# Patient Record
Sex: FEMALE | Race: WHITE | Employment: UNEMPLOYED | URBAN - NONMETROPOLITAN AREA
[De-identification: names, ages, dates, MRNs, and addresses within clinical notes are randomized per-mention and may not be internally consistent; named-entity substitution may affect disease eponyms.]

---

## 2018-06-05 ENCOUNTER — OFFICE VISIT (OUTPATIENT)
Dept: INTERNAL MEDICINE CLINIC | Age: 58
End: 2018-06-05
Payer: COMMERCIAL

## 2018-06-05 VITALS
HEIGHT: 62 IN | BODY MASS INDEX: 38.05 KG/M2 | DIASTOLIC BLOOD PRESSURE: 90 MMHG | SYSTOLIC BLOOD PRESSURE: 140 MMHG | HEART RATE: 92 BPM | WEIGHT: 206.8 LBS

## 2018-06-05 DIAGNOSIS — E23.0 HYPOPITUITARISM (HCC): ICD-10-CM

## 2018-06-05 DIAGNOSIS — R61 HYPERHIDROSIS: ICD-10-CM

## 2018-06-05 DIAGNOSIS — K21.9 GASTROESOPHAGEAL REFLUX DISEASE WITHOUT ESOPHAGITIS: ICD-10-CM

## 2018-06-05 DIAGNOSIS — E11.9 TYPE 2 DIABETES MELLITUS WITHOUT COMPLICATION, UNSPECIFIED LONG TERM INSULIN USE STATUS: ICD-10-CM

## 2018-06-05 DIAGNOSIS — N95.1 SWEATS, MENOPAUSAL: Primary | ICD-10-CM

## 2018-06-05 DIAGNOSIS — E66.9 OBESITY (BMI 30-39.9): ICD-10-CM

## 2018-06-05 DIAGNOSIS — E78.5 HYPERLIPIDEMIA, UNSPECIFIED HYPERLIPIDEMIA TYPE: ICD-10-CM

## 2018-06-05 DIAGNOSIS — I10 HYPERTENSION, UNSPECIFIED TYPE: ICD-10-CM

## 2018-06-05 PROCEDURE — 3046F HEMOGLOBIN A1C LEVEL >9.0%: CPT | Performed by: INTERNAL MEDICINE

## 2018-06-05 PROCEDURE — 2022F DILAT RTA XM EVC RTNOPTHY: CPT | Performed by: INTERNAL MEDICINE

## 2018-06-05 PROCEDURE — G8417 CALC BMI ABV UP PARAM F/U: HCPCS | Performed by: INTERNAL MEDICINE

## 2018-06-05 PROCEDURE — 1036F TOBACCO NON-USER: CPT | Performed by: INTERNAL MEDICINE

## 2018-06-05 PROCEDURE — G8427 DOCREV CUR MEDS BY ELIG CLIN: HCPCS | Performed by: INTERNAL MEDICINE

## 2018-06-05 PROCEDURE — 99204 OFFICE O/P NEW MOD 45 MIN: CPT | Performed by: INTERNAL MEDICINE

## 2018-06-05 PROCEDURE — 3017F COLORECTAL CA SCREEN DOC REV: CPT | Performed by: INTERNAL MEDICINE

## 2018-06-05 RX ORDER — GABAPENTIN 600 MG/1
800 TABLET ORAL 3 TIMES DAILY
COMMUNITY

## 2018-06-05 RX ORDER — TIZANIDINE 4 MG/1
4 TABLET ORAL 3 TIMES DAILY
COMMUNITY

## 2018-06-05 RX ORDER — VENLAFAXINE HYDROCHLORIDE 225 MG/1
225 TABLET, EXTENDED RELEASE ORAL
COMMUNITY

## 2018-06-05 RX ORDER — ALOGLIPTIN AND METFORMIN HYDROCHLORIDE 12.5; 1 MG/1; MG/1
1 TABLET, FILM COATED ORAL 2 TIMES DAILY
COMMUNITY
End: 2021-05-05 | Stop reason: ALTCHOICE

## 2018-06-05 RX ORDER — METOPROLOL TARTRATE 50 MG/1
50 TABLET, FILM COATED ORAL DAILY
COMMUNITY

## 2018-06-05 RX ORDER — ALBUTEROL SULFATE 90 UG/1
2 AEROSOL, METERED RESPIRATORY (INHALATION) EVERY 6 HOURS PRN
COMMUNITY

## 2018-06-05 RX ORDER — ATORVASTATIN CALCIUM 40 MG/1
40 TABLET, FILM COATED ORAL DAILY
COMMUNITY

## 2018-06-05 RX ORDER — RISPERIDONE 4 MG/1
4 TABLET, FILM COATED ORAL NIGHTLY
COMMUNITY

## 2018-06-05 RX ORDER — CLOPIDOGREL BISULFATE 75 MG/1
75 TABLET ORAL DAILY
COMMUNITY
End: 2021-05-05 | Stop reason: ALTCHOICE

## 2018-06-05 RX ORDER — LORATADINE 10 MG/1
10 TABLET ORAL DAILY
COMMUNITY
End: 2021-05-05 | Stop reason: ALTCHOICE

## 2018-06-05 RX ORDER — CLONAZEPAM 0.5 MG/1
0.5 TABLET ORAL DAILY
COMMUNITY

## 2018-06-05 RX ORDER — ACETAMINOPHEN AND CODEINE PHOSPHATE 300; 60 MG/1; MG/1
1 TABLET ORAL 4 TIMES DAILY
COMMUNITY
End: 2021-05-05 | Stop reason: ALTCHOICE

## 2018-06-05 RX ORDER — AMILORIDE HYDROCHLORIDE 5 MG/1
5 TABLET ORAL DAILY
COMMUNITY

## 2018-06-05 RX ORDER — MONTELUKAST SODIUM 10 MG/1
10 TABLET ORAL NIGHTLY
COMMUNITY

## 2018-06-05 RX ORDER — ARIPIPRAZOLE 30 MG/1
30 TABLET ORAL DAILY
COMMUNITY

## 2018-06-05 RX ORDER — ISOSORBIDE MONONITRATE 30 MG/1
30 TABLET, EXTENDED RELEASE ORAL DAILY
COMMUNITY

## 2018-06-05 RX ORDER — FAMOTIDINE 20 MG/1
20 TABLET, FILM COATED ORAL 2 TIMES DAILY
COMMUNITY

## 2018-06-05 ASSESSMENT — PATIENT HEALTH QUESTIONNAIRE - PHQ9
SUM OF ALL RESPONSES TO PHQ QUESTIONS 1-9: 0
SUM OF ALL RESPONSES TO PHQ9 QUESTIONS 1 & 2: 0
1. LITTLE INTEREST OR PLEASURE IN DOING THINGS: 0
2. FEELING DOWN, DEPRESSED OR HOPELESS: 0

## 2018-06-12 LAB
FOLLICLE STIMULATING HORMONE: 65.3 IU/L
LH: 27.5 IU/L
PROGESTERONE LEVEL: <0.05 NG/ML
PROLACTIN: 11.5 NG/ML (ref 4.8–33)
TSH SERPL DL<=0.05 MIU/L-ACNC: 2.01 UIU/ML (ref 0.4–4.1)

## 2018-06-13 LAB
ADRENOCORTICOTROPIC HORMONE: 8 PG/ML (ref 6–58)
GROWTH HORMONE: <0.05 NG/ML (ref 0.05–4)
IGF BINDING PROTEIN-3: 6180 NG/ML (ref 3400–6800)

## 2018-06-14 LAB
ESTRADIOL FREE: 6.8 PG/ML
ESTROGEN TOTAL: 27.2 PG/ML
ESTRONE: 20.4 PG/ML

## 2018-06-20 ENCOUNTER — OFFICE VISIT (OUTPATIENT)
Dept: INTERNAL MEDICINE CLINIC | Age: 58
End: 2018-06-20
Payer: COMMERCIAL

## 2018-06-20 VITALS
WEIGHT: 207 LBS | OXYGEN SATURATION: 97 % | DIASTOLIC BLOOD PRESSURE: 80 MMHG | HEART RATE: 116 BPM | SYSTOLIC BLOOD PRESSURE: 124 MMHG | HEIGHT: 62 IN | BODY MASS INDEX: 38.09 KG/M2

## 2018-06-20 DIAGNOSIS — N95.1 MENOPAUSAL HOT FLUSHES: Primary | ICD-10-CM

## 2018-06-20 DIAGNOSIS — G89.4 CHRONIC PAIN SYNDROME: ICD-10-CM

## 2018-06-20 DIAGNOSIS — E78.5 HYPERLIPIDEMIA, UNSPECIFIED HYPERLIPIDEMIA TYPE: ICD-10-CM

## 2018-06-20 DIAGNOSIS — I10 HYPERTENSION, UNSPECIFIED TYPE: ICD-10-CM

## 2018-06-20 PROCEDURE — G8417 CALC BMI ABV UP PARAM F/U: HCPCS | Performed by: INTERNAL MEDICINE

## 2018-06-20 PROCEDURE — 3017F COLORECTAL CA SCREEN DOC REV: CPT | Performed by: INTERNAL MEDICINE

## 2018-06-20 PROCEDURE — G8427 DOCREV CUR MEDS BY ELIG CLIN: HCPCS | Performed by: INTERNAL MEDICINE

## 2018-06-20 PROCEDURE — 99213 OFFICE O/P EST LOW 20 MIN: CPT | Performed by: INTERNAL MEDICINE

## 2018-06-20 PROCEDURE — 1036F TOBACCO NON-USER: CPT | Performed by: INTERNAL MEDICINE

## 2021-05-05 ENCOUNTER — INITIAL CONSULT (OUTPATIENT)
Dept: NEUROLOGY | Age: 61
End: 2021-05-05
Payer: MEDICARE

## 2021-05-05 VITALS
HEIGHT: 62 IN | BODY MASS INDEX: 41.41 KG/M2 | DIASTOLIC BLOOD PRESSURE: 58 MMHG | WEIGHT: 225 LBS | HEART RATE: 88 BPM | SYSTOLIC BLOOD PRESSURE: 104 MMHG

## 2021-05-05 DIAGNOSIS — G62.9 SMALL FIBER NEUROPATHY: ICD-10-CM

## 2021-05-05 DIAGNOSIS — R20.2 NUMBNESS AND TINGLING OF BOTH FEET: ICD-10-CM

## 2021-05-05 DIAGNOSIS — R20.0 NUMBNESS AND TINGLING OF BOTH FEET: ICD-10-CM

## 2021-05-05 DIAGNOSIS — E11.40 DIABETIC NEUROPATHY, PAINFUL (HCC): Primary | ICD-10-CM

## 2021-05-05 PROCEDURE — 99204 OFFICE O/P NEW MOD 45 MIN: CPT | Performed by: PSYCHIATRY & NEUROLOGY

## 2021-05-05 RX ORDER — CEFUROXIME AXETIL 250 MG/1
TABLET ORAL
COMMUNITY
Start: 2021-04-28

## 2021-05-05 RX ORDER — OXCARBAZEPINE 150 MG/1
150 TABLET, FILM COATED ORAL 2 TIMES DAILY
Qty: 60 TABLET | Refills: 3 | Status: SHIPPED | OUTPATIENT
Start: 2021-05-05

## 2021-05-05 RX ORDER — ASPIRIN 81 MG/1
81 TABLET ORAL DAILY
COMMUNITY

## 2021-05-05 RX ORDER — INSULIN DEGLUDEC INJECTION 100 U/ML
INJECTION, SOLUTION SUBCUTANEOUS
COMMUNITY

## 2021-05-05 RX ORDER — INSULIN ASPART 100 [IU]/ML
INJECTION, SOLUTION INTRAVENOUS; SUBCUTANEOUS
COMMUNITY
Start: 2021-04-28

## 2021-05-05 RX ORDER — HYDROCODONE BITARTRATE AND ACETAMINOPHEN 5; 325 MG/1; MG/1
TABLET ORAL
COMMUNITY
Start: 2020-12-15

## 2021-05-05 RX ORDER — AMLODIPINE BESYLATE 5 MG/1
5 TABLET ORAL
COMMUNITY
Start: 2020-12-15

## 2021-05-05 RX ORDER — BUSPIRONE HYDROCHLORIDE 15 MG/1
TABLET ORAL
COMMUNITY
Start: 2021-04-19

## 2021-05-05 RX ORDER — BUPROPION HYDROCHLORIDE 300 MG/1
TABLET ORAL
COMMUNITY
Start: 2021-04-16

## 2021-05-05 RX ORDER — FLUTICASONE PROPIONATE 50 MCG
SPRAY, SUSPENSION (ML) NASAL
COMMUNITY
Start: 2021-04-15

## 2021-05-05 NOTE — LETTER
[address] [facility]  [address] hospitals  Dept: [phone]  Dept Fax: [phone]  Loc: [phone]    Blessing Zelaya MD        5/5/2021      Patient:  Shi Harp  MRN:  [id number]  YOB: 1960  Date of Visit:  5/5/2021    Dear Dr. Cassie Esquivel,    Thank you for referring Apple Bhatt to me for consultation. Please see attached visit summary with my findings. If you have any questions, please do not hesitate to call me.       Sincerely,         Blessing Zelaya MD

## 2021-05-05 NOTE — PROGRESS NOTES
Chief Complaint   Patient presents with    Consultation     neuropathy     This is a 64year old female with numbness and burning sensation in bilateral feet, right worse than left, for the past year that has progressively gotten worse over the past 6 months. Her family doctor gave her a prescription for Gabapentin 800 mg three times daily with no improvement in symptoms. She was then referred to neurology. Symptoms are severe and constant. Symptoms improve when she elevates her feet. She has occasional numbness and burning in bilateral hands. No trunk numbness. She uses a wheelchair for ambulation due to COPD. When she does walk, no frequent falls. She has off balance feeling when she stands. No flu like illness or rash prior to onset. History is significant for diabetes, diagnosed at least 8 years ago. She reports her last hemoglobin A1c was 7.5. No history of chemotherapy or radiation. She has neck pain and back pain. She was told she has low vitamin B12 and is now taking vitamin B12 supplements. No history of trauma to the spine. History is significant for COVID in December 2020. No recent imaging of brain or spine. Vitamin B12 on 04/14/2021 was 1040. Folate on 04/14/2021 was 7.9. Sleep is poor and interrupted. She wakes up feeling tired. She has been told she snores. She has sleep apnea and uses CPAP. History provided by patient accompanied by her .      Past Medical History:   Diagnosis Date    CAD (coronary artery disease)     CKD (chronic kidney disease)     COPD (chronic obstructive pulmonary disease) (HCC)     Depression     GERD (gastroesophageal reflux disease)     Hyperlipidemia     Hypertension     Neuropathy     RLS (restless legs syndrome)     Type 2 diabetes mellitus without complication ([facility])        Patient Active Problem List   Diagnosis    Hyperlipidemia    Hypertension    GERD (gastroesophageal reflux disease)    Depression       Allergies   Allergen Reactions    Latex Rash    Nsaids Other (See Comments)     GI upset     Adhesive Tape Rash    Aspirin Nausea Only, Other (See Comments) and Nausea And Vomiting       Current Outpatient Medications   Medication Sig Dispense Refill    amLODIPine (NORVASC) 5 MG tablet 5 mg      busPIRone (BUSPAR) 15 MG tablet TAKE 1 TABLET BY MOUTH TWICE A DAY      SUMAtriptan Succinate 6 MG/0.5ML SOAJ INJECT 0.5ML AT ONSET OF HEADACHE MAY REPEAT DOSE AFTER 1 HR AS NEEDED ONCE A DAY SUBCUTANEOUS 30      buPROPion (WELLBUTRIN XL) 300 MG extended release tablet TAKE 1 TABLET BY MOUTH EVERY DAY IN THE MORNING      fluticasone (FLONASE) 50 MCG/ACT nasal spray USE 1 SPRAY IN EACH NOSTRIL TWICE A DAY      Insulin Aspart FlexPen 100 UNIT/ML SOPN INJECT 35 UNITS SUBCUTANEOSLY WITH BREAKFAST, 25 UNITS WITH LUNCH AND 35 UNITS WITH SUPPER      HYDROcodone-acetaminophen (NORCO) 5-325 MG per tablet Hydrocodone-Acetaminophen Active 1 TAB PO every 6 to 8 hours December 15th, 2020 6:23pm      aspirin 81 MG EC tablet Take 81 mg by mouth daily      Insulin Degludec (TRESIBA FLEXTOUCH) 100 UNIT/ML SOPN Inject into the skin      tiZANidine (ZANAFLEX) 4 MG tablet Take 4 mg by mouth 3 times daily      clonazePAM (KLONOPIN) 0.5 MG tablet Take 0.5 mg by mouth daily. Selene Shone gabapentin (NEURONTIN) 600 MG tablet Take 800 mg by mouth 3 times daily.        ARIPiprazole (ABILIFY) 30 MG tablet Take 30 mg by mouth daily      risperiDONE (RISPERDAL) 4 MG tablet Take 4 mg by mouth nightly      venlafaxine 225 MG extended release tablet Take 225 mg by mouth daily (with breakfast)      isosorbide mononitrate (IMDUR) 30 MG extended release tablet Take 30 mg by mouth daily      famotidine (PEPCID) 20 MG tablet Take 20 mg by mouth 2 times daily      metoprolol tartrate (LOPRESSOR) 50 MG tablet Take 50 mg by mouth daily      atorvastatin (LIPITOR) 40 MG tablet Take 40 mg by mouth daily      montelukast (SINGULAIR) 10 MG tablet Take 10 mg by mouth nightly      aMILoride (MIDAMOR) 5 MG tablet Take 5 mg by mouth daily      albuterol sulfate  (90 Base) MCG/ACT inhaler Inhale 2 puffs into the lungs every 6 hours as needed for Wheezing       No current facility-administered medications for this visit. Social History     Socioeconomic History    Marital status:      Spouse name: None    Number of children: None    Years of education: None    Highest education level: None   Occupational History    None   Social Needs    Financial resource strain: None    Food insecurity     Worry: None     Inability: None    Transportation needs     Medical: None     Non-medical: None   Tobacco Use    Smoking status: Former Smoker     Packs/day: 2.00     Years: 37.00     Pack years: 74.00     Types: Cigarettes     Start date: 1975     Quit date: 2017     Years since quittin.2    Smokeless tobacco: Never Used   Substance and Sexual Activity    Alcohol use: No     Comment: hx alcohol abuse    Drug use: No    Sexual activity: Not Currently   Lifestyle    Physical activity     Days per week: None     Minutes per session: None    Stress: None   Relationships    Social connections     Talks on phone: None     Gets together: None     Attends [employer] service: None     Active member of club or organization: None     Attends meetings of clubs or organizations: None     Relationship status: None    Intimate partner violence     Fear of current or ex partner: None     Emotionally abused: None     Physically abused: None     Forced sexual activity: None   Other Topics Concern    None   Social History Narrative    None       Family History   Problem Relation Age of Onset    Cancer Mother         breast     Cancer Father     Diabetes Father          I reviewed the past medical history, allergies, medications, social history and family history.      Review of Systems   All systems reviewed, and are all negative, except what is mentioned in HPI      Vitals:    05/05/21 1356   BP: (!) 104/58   Site: Left Upper Arm   Position: Sitting   Cuff Size: Large Adult   Pulse: 88   Weight: 225 lb (102.1 kg)   Height: 5' 2\" (1.575 m)       Physical Examination:  General appearance - alert, well appearing, and in no distress, oriented to person, place, and time and overweight,she is using a wheel chair. Mental status- Level of Alertness: awake  Orientation: person, place, time  Memory: normal  Fund of Knowledge: normal  Attention/Concentration: normal  Language: normal. Mood is normal.   Neck - supple, no significant adenopathy, carotids upstroke normal bilaterally. There is no axillary lymphadenopathy. There is no carotid bruit . No neck lymphadenopathy . No thyroid enlargement   Neurological -   Cranial [id number]:.   Cranial nerve II: Normal   Cranial nerve III: Pupils: equal, round, reactive to light  Cranial nerves III, IV, VI: Extraocular Movements: intact   Cranial nerve V: Facial sensation: intact   Cranial nerve VII:Facial strength: intact   Cranial nerve VIII: Hearing: intact   Cranial nerve IX: Palate Elevation intact bilaterally  Cranial nerve XI: Shoulder shrug intact bilaterally  Cranial nerve XII: Tongue midline   neck supple without rigidity, there is no limitation of range of motion of the neck. DTR's are decreased distal and symmetric  Babinski sign negative. Motor exam is 5/5 in the upper and lower extremities. Normal muscle tone . There is no muscle atrophy. Sensory is intact for light touch, cortical sensation. Coordination: finger to nose intact  Gait and station intact guarded  Abnormal movement none, vibration normal, proprioception normal  Skin - warm, dry to touch, normal coloration, no rashes, no suspicious skin lesions  Superficial temporal artery pulses are normal.   There is no limitation of range of motion of the neck. There is no resting tremor, no pin rolling, no bradykinesia, no Hypohonia, normal blink rate. Musculoskeletal: Has no hand arthritis, no limitation of ROM in any of the four extremities. There is no leg edema +1 right leg. The Heart was regular in rate and rhythm. No heart murmur  Chest Clear, scattered rhonchi . Abdomen soft, intact bowel sounds. We reviewed the patient records from referring provider and available information in the EHR   [id number]   4//2021  Folate =7.9   4/2021    ASSESSMENT:      Diagnosis Orders   1. Diabetic neuropathy, painful ([address]     2. Numbness and tingling of both feet     3. Small fiber neuropathy        This is a 57-year-old female presents with symptoms of numbness and burning sensation in bilateral feet, right worse than left, for the past year that has progressively gotten worse over the past 6 months. Her family doctor gave her a prescription for Gabapentin 800 mg three times daily with no improvement in symptoms. Symptoms are severe and constant. Symptoms improve when she elevates her feet. She has occasional numbness and burning in bilateral hands. No trunk numbness. She uses a wheelchair for ambulation due to COPD. I reviewed the patient pertinent labs and records in the EHR and from other providers. B12 was 1040, folate was 7.9. Her symptoms are likely due to long standing diabetic neuropathy, symptoms for one year, worse over the last 6 months. She uses wheel chair, she has chronic back pain for years, she follows with pain. Her right foot swollen. She will need to undergo B6 level, and we discussed medication options for relief of her symptoms, as I suspect she has small fiber neuropathy secondary to longstanding diabetes causing the bilateral foot pain, and numbness in the hands. We will start her on Trileptal 150 mg twice a day. The patient was counseled about the medication, side effects. After detailed discussion with patient we agreed on the following plan. Plan    1. B6  2. Folate  3. B12  4. Start Trileptal 150 mg twice a Day.    5. Call with any new symptoms or concerns. 6. Follow up in 1 months.        Total time 53 minutes    Paco Borges MD

## 2021-05-11 LAB — VITAMIN B6: 29 NMOL/L (ref 20–125)
